# Patient Record
Sex: FEMALE | Race: WHITE | Employment: OTHER | URBAN - METROPOLITAN AREA
[De-identification: names, ages, dates, MRNs, and addresses within clinical notes are randomized per-mention and may not be internally consistent; named-entity substitution may affect disease eponyms.]

---

## 2017-01-09 ENCOUNTER — TELEPHONE (OUTPATIENT)
Dept: NEUROLOGY | Facility: CLINIC | Age: 42
End: 2017-01-09

## 2017-01-10 RX ORDER — DIVALPROEX SODIUM 500 MG/1
500 TABLET, DELAYED RELEASE ORAL 2 TIMES DAILY
Qty: 60 TABLET | Refills: 0 | Status: SHIPPED | OUTPATIENT
Start: 2017-01-10 | End: 2017-02-10

## 2017-02-10 RX ORDER — DIVALPROEX SODIUM 500 MG/1
500 TABLET, DELAYED RELEASE ORAL 2 TIMES DAILY
Qty: 60 TABLET | Refills: 0 | Status: SHIPPED | OUTPATIENT
Start: 2017-02-10 | End: 2017-04-18

## 2017-02-10 NOTE — TELEPHONE ENCOUNTER
Refill request for depakote 500 mg, BID, #60, no refills    LOV: 09/16/15  NOV: none      Called patient,no answer,left detailed vm asking patient to call back our office to schedule for a follow-up appointment.

## 2017-02-22 ENCOUNTER — HOSPITAL ENCOUNTER (OUTPATIENT)
Dept: MRI IMAGING | Age: 42
Discharge: HOME | End: 2017-02-22
Attending: INTERNAL MEDICINE
Payer: COMMERCIAL

## 2017-02-22 DIAGNOSIS — M51.26 LUMBAR HERNIATED DISC: ICD-10-CM

## 2017-02-22 DIAGNOSIS — M54.50 LOW BACK PAIN: ICD-10-CM

## 2017-02-22 PROCEDURE — 72148 MRI LUMBAR SPINE W/O DYE: CPT

## 2017-03-19 ENCOUNTER — LAB ENCOUNTER (OUTPATIENT)
Dept: LAB | Facility: HOSPITAL | Age: 42
End: 2017-03-19
Attending: INTERNAL MEDICINE
Payer: COMMERCIAL

## 2017-03-19 DIAGNOSIS — R79.89 LOW VITAMIN D LEVEL: ICD-10-CM

## 2017-03-19 DIAGNOSIS — R19.09 RIGHT GROIN MASS: ICD-10-CM

## 2017-03-19 LAB
ALBUMIN SERPL BCP-MCNC: 3.8 G/DL (ref 3.5–4.8)
ALBUMIN SERPL BCP-MCNC: 3.8 G/DL (ref 3.5–4.8)
ALP SERPL-CCNC: 28 U/L (ref 32–100)
ALT SERPL-CCNC: 15 U/L (ref 14–54)
ANION GAP SERPL CALC-SCNC: 8 MMOL/L (ref 0–18)
AST SERPL-CCNC: 19 U/L (ref 15–41)
BASOPHILS # BLD: 0 K/UL (ref 0–0.2)
BASOPHILS NFR BLD: 0 %
BILIRUB DIRECT SERPL-MCNC: 0.1 MG/DL (ref 0–0.2)
BILIRUB SERPL-MCNC: 0.7 MG/DL (ref 0.3–1.2)
BUN SERPL-MCNC: 18 MG/DL (ref 8–20)
BUN/CREAT SERPL: 19.6 (ref 10–20)
CALCIUM SERPL-MCNC: 9.2 MG/DL (ref 8.5–10.5)
CHLORIDE SERPL-SCNC: 106 MMOL/L (ref 95–110)
CO2 SERPL-SCNC: 24 MMOL/L (ref 22–32)
CREAT SERPL-MCNC: 0.92 MG/DL (ref 0.5–1.5)
EOSINOPHIL # BLD: 0.1 K/UL (ref 0–0.7)
EOSINOPHIL NFR BLD: 2 %
ERYTHROCYTE [DISTWIDTH] IN BLOOD BY AUTOMATED COUNT: 13 % (ref 11–15)
ERYTHROCYTE [SEDIMENTATION RATE] IN BLOOD: 7 MM/HR (ref 0–20)
GLUCOSE SERPL-MCNC: 90 MG/DL (ref 70–99)
HCT VFR BLD AUTO: 38.5 % (ref 35–48)
HGB BLD-MCNC: 12.8 G/DL (ref 12–16)
LYMPHOCYTES # BLD: 1.2 K/UL (ref 1–4)
LYMPHOCYTES NFR BLD: 21 %
MCH RBC QN AUTO: 28.9 PG (ref 27–32)
MCHC RBC AUTO-ENTMCNC: 33.2 G/DL (ref 32–37)
MCV RBC AUTO: 87 FL (ref 80–100)
MONOCYTES # BLD: 0.5 K/UL (ref 0–1)
MONOCYTES NFR BLD: 9 %
NEUTROPHILS # BLD AUTO: 3.7 K/UL (ref 1.8–7.7)
NEUTROPHILS NFR BLD: 68 %
OSMOLALITY UR CALC.SUM OF ELEC: 287 MOSM/KG (ref 275–295)
PHOSPHATE SERPL-MCNC: 3.6 MG/DL (ref 2.4–4.7)
PLATELET # BLD AUTO: 205 K/UL (ref 140–400)
PMV BLD AUTO: 9.2 FL (ref 7.4–10.3)
POTASSIUM SERPL-SCNC: 4.2 MMOL/L (ref 3.3–5.1)
PROT SERPL-MCNC: 6.7 G/DL (ref 5.9–8.4)
RBC # BLD AUTO: 4.42 M/UL (ref 3.7–5.4)
SODIUM SERPL-SCNC: 138 MMOL/L (ref 136–144)
WBC # BLD AUTO: 5.5 K/UL (ref 4–11)

## 2017-03-19 PROCEDURE — 80076 HEPATIC FUNCTION PANEL: CPT

## 2017-03-19 PROCEDURE — 80069 RENAL FUNCTION PANEL: CPT

## 2017-03-19 PROCEDURE — 36415 COLL VENOUS BLD VENIPUNCTURE: CPT

## 2017-03-19 PROCEDURE — 85025 COMPLETE CBC W/AUTO DIFF WBC: CPT

## 2017-03-19 PROCEDURE — 85652 RBC SED RATE AUTOMATED: CPT

## 2017-03-19 PROCEDURE — 82306 VITAMIN D 25 HYDROXY: CPT

## 2017-03-20 LAB — 25(OH)D3 SERPL-MCNC: 21.2 NG/ML

## 2017-04-18 NOTE — TELEPHONE ENCOUNTER
Dr. Nice Meals patient   Refill request for depakote 500 mg, BID, #60, no refills    LOV: 9/16/15  NOV: None

## 2017-04-19 RX ORDER — DIVALPROEX SODIUM 500 MG/1
500 TABLET, DELAYED RELEASE ORAL 2 TIMES DAILY
Qty: 60 TABLET | Refills: 0 | Status: SHIPPED | OUTPATIENT
Start: 2017-04-19 | End: 2017-06-09

## 2017-04-29 ENCOUNTER — HOSPITAL ENCOUNTER (EMERGENCY)
Facility: HOSPITAL | Age: 42
Discharge: HOME | End: 2017-04-29
Attending: EMERGENCY MEDICINE
Payer: COMMERCIAL

## 2017-04-29 VITALS
BODY MASS INDEX: 22.5 KG/M2 | HEART RATE: 77 BPM | WEIGHT: 140 LBS | OXYGEN SATURATION: 100 % | HEIGHT: 66 IN | SYSTOLIC BLOOD PRESSURE: 150 MMHG | DIASTOLIC BLOOD PRESSURE: 70 MMHG | TEMPERATURE: 98 F | RESPIRATION RATE: 14 BRPM

## 2017-04-29 DIAGNOSIS — K14.0 TONGUE INFECTION: Primary | ICD-10-CM

## 2017-04-29 DIAGNOSIS — K11.20 SIALOADENITIS: ICD-10-CM

## 2017-04-29 PROCEDURE — 99283 EMERGENCY DEPT VISIT LOW MDM: CPT

## 2017-04-29 RX ORDER — AMOXICILLIN AND CLAVULANATE POTASSIUM 875; 125 MG/1; MG/1
1 TABLET, FILM COATED ORAL 2 TIMES DAILY
Qty: 20 TABLET | Refills: 0 | Status: SHIPPED | OUTPATIENT
Start: 2017-04-29 | End: 2017-05-09

## 2017-04-29 RX ORDER — IBUPROFEN 600 MG/1
600 TABLET ORAL ONCE
Status: COMPLETED | OUTPATIENT
Start: 2017-04-29 | End: 2017-04-29

## 2017-04-30 NOTE — ED INITIAL ASSESSMENT (HPI)
Patient states tongue swelling getting progressively worse since yesterday; denies difficulty breathing or swallowing. Patient controlling secretions and speaking in full sentences.

## 2017-04-30 NOTE — ED PROVIDER NOTES
Patient Seen in: [facility] AND [facility] Emergency Department    History   Patient presents with:  Swelling Edema (cardiovascular, metabolic)    Stated Complaint: swollne tongue    HPI    Healthy 80-year-old female who began having some mild right lateral tong Temp src 04/29/17 2319 Oral   SpO2 04/29/17 2319 100 %   O2 Device 04/29/17 2319 None (Room air)       Current:/70 mmHg  Pulse 77  Temp(Src) 98.1 °F (36.7 °C) (Oral)  Resp 14  Ht 167.6 cm (5' 6\")  Wt 63.504 kg  BMI 22.61 kg/m2  SpO2 100%        Ph diagnosis)  Sialoadenitis    Disposition:  Discharge    Follow-up:  Laisha Josue 52  [phone]    Schedule an appointment as soon as possible for a visit in 2 days        Medications Prescribed:  C

## 2017-05-01 PROCEDURE — 87252 VIRUS INOCULATION TISSUE: CPT | Performed by: INTERNAL MEDICINE

## 2017-05-03 ENCOUNTER — TELEPHONE (OUTPATIENT)
Dept: NEUROLOGY | Facility: CLINIC | Age: 42
End: 2017-05-03

## 2017-05-03 NOTE — TELEPHONE ENCOUNTER
Teddy Tamayo said she forgot about the appointment. I asked if she received a reminder call, and she said she can't remember.  However, when [employer] was checked, Teddy Tamayo actually confirmed her appt on 5-1-17 at 5:44 pm  She will call back to reschedule

## 2017-05-08 PROBLEM — K62.5 BRIGHT RED RECTAL BLEEDING: Status: ACTIVE | Noted: 2017-05-08

## 2017-05-08 PROBLEM — R19.09 RIGHT GROIN MASS: Status: ACTIVE | Noted: 2017-05-08

## 2017-06-09 RX ORDER — DIVALPROEX SODIUM 500 MG/1
500 TABLET, DELAYED RELEASE ORAL 2 TIMES DAILY
Qty: 60 TABLET | Refills: 0 | Status: SHIPPED | OUTPATIENT
Start: 2017-06-09 | End: 2017-07-14

## 2017-06-09 NOTE — TELEPHONE ENCOUNTER
LOV 9/16/15. Multiple messages for patient to make NOV with past refills. No show 5/3/17. Request refill for depakote, please advise if will refill. I spoke to patient and transferred to  to make [address].  States she just wants medication refilled,

## 2017-07-12 RX ORDER — DIVALPROEX SODIUM 500 MG/1
TABLET, DELAYED RELEASE ORAL
Qty: 60 TABLET | Refills: 0 | OUTPATIENT
Start: 2017-07-12

## 2017-07-14 ENCOUNTER — OFFICE VISIT (OUTPATIENT)
Dept: NEUROLOGY | Facility: CLINIC | Age: 42
End: 2017-07-14

## 2017-07-14 VITALS
SYSTOLIC BLOOD PRESSURE: 102 MMHG | BODY MASS INDEX: 22.5 KG/M2 | HEART RATE: 56 BPM | DIASTOLIC BLOOD PRESSURE: 64 MMHG | RESPIRATION RATE: 16 BRPM | WEIGHT: 140 LBS | OXYGEN SATURATION: 97 % | HEIGHT: 66 IN

## 2017-07-14 DIAGNOSIS — M54.16 LUMBAR RADICULOPATHY: ICD-10-CM

## 2017-07-14 DIAGNOSIS — G40.909 SEIZURE DISORDER (HCC): Primary | ICD-10-CM

## 2017-07-14 PROCEDURE — 99213 OFFICE O/P EST LOW 20 MIN: CPT | Performed by: OTHER

## 2017-07-14 RX ORDER — DIVALPROEX SODIUM 500 MG/1
500 TABLET, DELAYED RELEASE ORAL 2 TIMES DAILY
Qty: 180 TABLET | Refills: 3 | Status: SHIPPED | OUTPATIENT
Start: 2017-07-14 | End: 2017-10-12

## 2017-07-14 NOTE — PROGRESS NOTES
Nupur Rawls : 1975     HPI:   Patient presents with:  Medication Follow-Up: LOV 09/16/15. Pt has had no seizure activity. Depokote 500mg po bid. Pt reports being totally asymptomtic.       Nupur Rawls was seen for neurologic evaluation July POSSIBLE POLYPECTOMY 68612;  Surgeon: Leena Sanches MD;  Location: [address] [place] [address]  , :       Comment: x 2   Family History   Problem Relation Age of Onset   • Cancer Brother      lymphoma   • Hypertension Father    • Janet Rowell attention span and concentration. Speech and language normal.  Oriented times three. Cranial Nerves: II-Visual acuity grossly normal, with full visual fields. Pupil react to light. Fundoscopic exam normal.  III,IV,VI- EOM full, with normal pursuit.  V

## 2017-08-07 RX ORDER — DIVALPROEX SODIUM 500 MG/1
TABLET, DELAYED RELEASE ORAL
Qty: 60 TABLET | Refills: 0 | OUTPATIENT
Start: 2017-08-07

## 2017-09-01 ENCOUNTER — OFFICE VISIT (OUTPATIENT)
Dept: INTERNAL MEDICINE CLINIC | Facility: CLINIC | Age: 42
End: 2017-09-01

## 2017-09-01 VITALS
WEIGHT: 144 LBS | DIASTOLIC BLOOD PRESSURE: 64 MMHG | HEART RATE: 88 BPM | HEIGHT: 66 IN | OXYGEN SATURATION: 98 % | BODY MASS INDEX: 23.14 KG/M2 | SYSTOLIC BLOOD PRESSURE: 96 MMHG

## 2017-09-01 DIAGNOSIS — Z00.00 PHYSICAL EXAM, ANNUAL: Primary | ICD-10-CM

## 2017-09-01 DIAGNOSIS — K64.9 HEMORRHOIDS, UNSPECIFIED HEMORRHOID TYPE: ICD-10-CM

## 2017-09-01 DIAGNOSIS — G40.909 SEIZURE DISORDER (HCC): ICD-10-CM

## 2017-09-01 DIAGNOSIS — M54.16 LUMBAR RADICULOPATHY: ICD-10-CM

## 2017-09-01 PROCEDURE — 99386 PREV VISIT NEW AGE 40-64: CPT | Performed by: INTERNAL MEDICINE

## 2017-09-01 NOTE — PROGRESS NOTES
Oval Phlegm is a 38year old female.     HPI:   Patient presents with:  Establish Care      \"Go-scia\" is 37 y/o F here to establish care and for physical exam; previous MD Dr Sal Chow; had recent UTI hat wa treated with Bactrim; no dysuria; no F/C;  n hearing loss and nasal drainage  Eyes:  Negative for eye discharge and vision loss  Cardiovascular:  Negative for chest pain; negative palpitations  Respiratory:  Negative for cough, dyspnea and wheezing  Endocrine:  Negative for abnormal sleep patterns, i Timothy    Lumbar radiculopathy  MRI lumbar spine in Feb 2017 shows foraminal stenosis at L5-S1 on the right; had RLE sciatica at that time; was given referral for physiatrist, though she was unable to pursue;   Pt requests order for physical therapy to [facility]

## 2017-10-31 ENCOUNTER — OFFICE VISIT (OUTPATIENT)
Dept: OBGYN CLINIC | Facility: CLINIC | Age: 42
End: 2017-10-31

## 2017-10-31 VITALS
HEART RATE: 69 BPM | BODY MASS INDEX: 24 KG/M2 | DIASTOLIC BLOOD PRESSURE: 71 MMHG | SYSTOLIC BLOOD PRESSURE: 107 MMHG | WEIGHT: 148.63 LBS

## 2017-10-31 DIAGNOSIS — Z12.31 VISIT FOR SCREENING MAMMOGRAM: ICD-10-CM

## 2017-10-31 DIAGNOSIS — Z12.4 SCREENING FOR MALIGNANT NEOPLASM OF CERVIX: ICD-10-CM

## 2017-10-31 DIAGNOSIS — Z01.419 ENCOUNTER FOR GYNECOLOGICAL EXAMINATION WITHOUT ABNORMAL FINDING: Primary | ICD-10-CM

## 2017-10-31 PROCEDURE — 99396 PREV VISIT EST AGE 40-64: CPT | Performed by: OBSTETRICS & GYNECOLOGY

## 2017-10-31 RX ORDER — DIVALPROEX SODIUM 500 MG/1
1000 TABLET, DELAYED RELEASE ORAL
COMMUNITY
End: 2019-07-29

## 2017-10-31 RX ORDER — IBUPROFEN 600 MG/1
600 TABLET ORAL
COMMUNITY
End: 2018-02-23 | Stop reason: ALTCHOICE

## 2017-10-31 NOTE — PROGRESS NOTES
Colton Beebe is a 38year old female [id number] Patient's last menstrual period was 09/29/2017.   Patient presents with:  Gyn Exam: Annual and mammo order, pt wants IUD removed  Pt  was plannig to have vasectomy but during preparation for surgery he not use an assistive device. .      The patient does live in a home with stairs.            FAMILY HISTORY:  Family History   Problem Relation Age of Onset   • Cancer Brother      lymphoma   • Hypertension Father    • Stroke Father      CVA       MEDICATIONS Oriented to time, place, person and situation. Appropriate mood and affect    Pelvic Exam:  External Genitalia: normal appearance, hair distribution, and no lesions, ?  Old healed follicultis in right groin, vs chain of lymph nodes- will f/u with pcp  Shawn Jimenez

## 2017-11-17 NOTE — PROGRESS NOTES
Neg pap, HPV neg, repeat pap needed in 3 years, return to clinic 1 year for annual exam. Letter Sent

## 2018-05-08 ENCOUNTER — TELEPHONE (OUTPATIENT)
Dept: PEDIATRICS CLINIC | Facility: CLINIC | Age: 43
End: 2018-05-08

## 2018-05-08 NOTE — TELEPHONE ENCOUNTER
Pt is on period, lmp 5/8, and wanting to have her IUD removed. Appointment made for when she is not on her period to have her IUD removed with [facility]. Pt states that she had bloating and swelling in her stomach during ovulation recently. Pt wants to know if this could be from the IUD, because she never had problems with these symptoms before, they are recently new.

## 2018-05-17 PROCEDURE — 82746 ASSAY OF FOLIC ACID SERUM: CPT | Performed by: INTERNAL MEDICINE

## 2018-05-17 PROCEDURE — 82607 VITAMIN B-12: CPT | Performed by: INTERNAL MEDICINE

## 2018-05-29 ENCOUNTER — OFFICE VISIT (OUTPATIENT)
Dept: OBGYN CLINIC | Facility: CLINIC | Age: 43
End: 2018-05-29

## 2018-05-29 VITALS
BODY MASS INDEX: 24 KG/M2 | DIASTOLIC BLOOD PRESSURE: 75 MMHG | SYSTOLIC BLOOD PRESSURE: 119 MMHG | WEIGHT: 148 LBS | HEART RATE: 60 BPM

## 2018-05-29 DIAGNOSIS — Z30.432 ENCOUNTER FOR REMOVAL OF INTRAUTERINE CONTRACEPTIVE DEVICE: Primary | ICD-10-CM

## 2018-05-29 PROCEDURE — 58301 REMOVE INTRAUTERINE DEVICE: CPT | Performed by: OBSTETRICS & GYNECOLOGY

## 2018-11-12 ENCOUNTER — OFFICE VISIT (OUTPATIENT)
Dept: OBGYN CLINIC | Facility: CLINIC | Age: 43
End: 2018-11-12

## 2018-11-12 VITALS
BODY MASS INDEX: 24.99 KG/M2 | DIASTOLIC BLOOD PRESSURE: 81 MMHG | WEIGHT: 150 LBS | HEIGHT: 65 IN | SYSTOLIC BLOOD PRESSURE: 121 MMHG | HEART RATE: 61 BPM

## 2018-11-12 DIAGNOSIS — Z01.419 ENCOUNTER FOR GYNECOLOGICAL EXAMINATION WITHOUT ABNORMAL FINDING: Primary | ICD-10-CM

## 2018-11-12 DIAGNOSIS — Z12.31 VISIT FOR SCREENING MAMMOGRAM: ICD-10-CM

## 2018-11-12 DIAGNOSIS — N94.3 PMS (PREMENSTRUAL SYNDROME): ICD-10-CM

## 2018-11-12 PROCEDURE — 99396 PREV VISIT EST AGE 40-64: CPT | Performed by: OBSTETRICS & GYNECOLOGY

## 2018-11-12 PROCEDURE — 99212 OFFICE O/P EST SF 10 MIN: CPT | Performed by: OBSTETRICS & GYNECOLOGY

## 2018-11-12 RX ORDER — NORETHINDRONE ACETATE AND ETHINYL ESTRADIOL 1MG-20(21)
1 KIT ORAL DAILY
Qty: 3 PACKAGE | Refills: 3 | Status: SHIPPED | OUTPATIENT
Start: 2018-11-12 | End: 2019-07-29 | Stop reason: ALTCHOICE

## 2018-11-12 NOTE — PROGRESS NOTES
Milton Justin is a 41year old female [id number] Patient's last menstrual period was 10/22/2018. Patient presents with:  Gyn Exam: ANNUAL EXAM  Pt has noted worsening mood swings after ovulation until her menstrual flow begins.   We discussed treatment opt Occupational History      Not on file    Tobacco Use      Smoking status: Never Smoker      Smokeless tobacco: Never Used    Substance and Sexual Activity      Alcohol use:  Yes        Alcohol/week: 0.0 oz        Comment: 1 glass wine socially      Drug us itching  Musculoskeletal:  denies back pain. Skin/Breast:  Denies any breast pain, lumps, or discharge. Neurological:  denies headaches, extremity weakness or numbness. Psychiatric: denies depression or anxiety.   Endocrine:   denies excessive thirst or (JUNEL FE 1/20) 1-20 MG-MCG Oral Tab 3 Package 3     Sig: Take 1 tablet by mouth daily.        [facility] IBRAHIMA 2D+3D SCREENING BILAT (CPT=77067/24388)

## 2019-07-30 PROCEDURE — 80164 ASSAY DIPROPYLACETIC ACD TOT: CPT | Performed by: INTERNAL MEDICINE

## 2019-12-06 ENCOUNTER — TELEPHONE (OUTPATIENT)
Dept: OBGYN CLINIC | Facility: CLINIC | Age: 44
End: 2019-12-06

## 2019-12-06 DIAGNOSIS — Z12.31 VISIT FOR SCREENING MAMMOGRAM: Primary | ICD-10-CM

## 2019-12-06 NOTE — TELEPHONE ENCOUNTER
LAST ANNUAL 11-12-18, LAST PAP 10-31-17 AND LAST MAMMO 9-15-15. ASSISTED IN [facility] NEXT ANNUAL ON 2-11-20. PT IS ASKING FOR A MAMMO ORDER.   ADVISED SHE NEEDS TO WAIT FOR EXAM BUT SHE ASKED US TO SEND THE MESSAGE ANYWAY BECAUSE SHE WOULD LIKE TO GET I

## 2019-12-06 NOTE — TELEPHONE ENCOUNTER
Pt states that she forgot to schedule her mammo and it seems to has . Pt would like to know if Dr. Aristides Rios can put another order in the system.  Please advise        [facility] IBRAHIMA 2D+3D SCREENING BILAT (CPT=77067/99758)

## 2019-12-09 NOTE — TELEPHONE ENCOUNTER
Informed pt [facility] approved mammo order. Mammo ordered. Provided pt with phone number to schedule. Pt verbalized understanding.

## 2020-02-04 ENCOUNTER — HOSPITAL ENCOUNTER (OUTPATIENT)
Dept: MAMMOGRAPHY | Facility: HOSPITAL | Age: 45
Discharge: HOME | End: 2020-02-04
Attending: OBSTETRICS & GYNECOLOGY
Payer: COMMERCIAL

## 2020-02-04 DIAGNOSIS — Z12.31 VISIT FOR SCREENING MAMMOGRAM: ICD-10-CM

## 2020-02-04 PROCEDURE — 77067 SCR MAMMO BI INCL CAD: CPT | Performed by: OBSTETRICS & GYNECOLOGY

## 2020-02-04 PROCEDURE — 77063 BREAST TOMOSYNTHESIS BI: CPT | Performed by: OBSTETRICS & GYNECOLOGY

## 2020-02-11 ENCOUNTER — OFFICE VISIT (OUTPATIENT)
Dept: OBGYN CLINIC | Facility: CLINIC | Age: 45
End: 2020-02-11
Payer: COMMERCIAL

## 2020-02-11 VITALS
HEIGHT: 66 IN | HEART RATE: 74 BPM | WEIGHT: 149 LBS | SYSTOLIC BLOOD PRESSURE: 125 MMHG | BODY MASS INDEX: 23.95 KG/M2 | DIASTOLIC BLOOD PRESSURE: 83 MMHG

## 2020-02-11 DIAGNOSIS — Z01.419 ENCOUNTER FOR GYNECOLOGICAL EXAMINATION WITHOUT ABNORMAL FINDING: Primary | ICD-10-CM

## 2020-02-11 DIAGNOSIS — N39.3 SUI (STRESS URINARY INCONTINENCE, FEMALE): ICD-10-CM

## 2020-02-11 PROCEDURE — 99396 PREV VISIT EST AGE 40-64: CPT | Performed by: OBSTETRICS & GYNECOLOGY

## 2020-02-11 PROCEDURE — 99212 OFFICE O/P EST SF 10 MIN: CPT | Performed by: OBSTETRICS & GYNECOLOGY

## 2020-02-11 NOTE — PROGRESS NOTES
Milton Justin is a 39year old female [id number] Patient's last menstrual period was 01/20/2020 (approximate). Patient presents with:  Gyn Exam: annual  .     Her cycles are  regular. She has c/o leaking when jumping at he gym.   We discussed MAITE and kege Days per week: Not on file        Minutes per session: Not on file      Stress: Not on file    Relationships      Social connections:        Talks on phone: Not on file        Gets together: Not on file        Attends Mandae[employer] service: Not on file constipation  Genitourinary:  denies dysuria, incontinence, abnormal vaginal discharge, vaginal itching  Musculoskeletal:  denies back pain. Skin/Breast:  Denies any breast pain, lumps, or discharge.    Neurological:  denies headaches, extremity weakness o requested or ordered in this encounter       None

## 2020-02-28 ENCOUNTER — TELEPHONE (OUTPATIENT)
Dept: OBGYN CLINIC | Facility: CLINIC | Age: 45
End: 2020-02-28

## 2020-02-28 DIAGNOSIS — Z32.00 PREGNANCY EXAMINATION OR TEST, PREGNANCY UNCONFIRMED: Primary | ICD-10-CM

## 2020-02-28 NOTE — TELEPHONE ENCOUNTER
LMP 2-28-20 and she had one on 2-12-20. Pt states that it is unusual for her to get two periods in one month. Pt state that she usually gets her period every 30 days. Pt wanting an IUD the Paragard. Pt had her Annual with [facility] 2-11-20 and IUD was not discussed. Pt would like to get a Paragard with this period. She states that she does not want to come in and discuss, because she states that she has had three IUDs in the pass. Sent to [facility] for recs.

## 2020-03-02 NOTE — TELEPHONE ENCOUNTER
[id number] Nadine Argueta for her to schedule the IUD placement- paragard without being seen first because I know her and she has had IUD's in the past.  We will order a serum qual bhcg the day before due to her last menses was irregular

## 2020-03-03 NOTE — TELEPHONE ENCOUNTER
Per [facility]-pt needs to complete STAT hcg qual this morning or appt for IUD insertion will need to be rescheduled. Called pt and [address] Laisha Pineda entered as STAT and pt was informed in VM that if unable to get lab done this morning will need to reschedule.

## 2021-04-01 ENCOUNTER — TELEPHONE (OUTPATIENT)
Dept: OBGYN CLINIC | Facility: CLINIC | Age: 46
End: 2021-04-01

## 2021-04-01 ENCOUNTER — OFFICE VISIT (OUTPATIENT)
Dept: OBGYN CLINIC | Facility: CLINIC | Age: 46
End: 2021-04-01
Payer: COMMERCIAL

## 2021-04-01 VITALS
WEIGHT: 152 LBS | SYSTOLIC BLOOD PRESSURE: 132 MMHG | BODY MASS INDEX: 25 KG/M2 | HEART RATE: 70 BPM | DIASTOLIC BLOOD PRESSURE: 85 MMHG

## 2021-04-01 DIAGNOSIS — Z12.31 ENCOUNTER FOR SCREENING MAMMOGRAM FOR MALIGNANT NEOPLASM OF BREAST: Primary | ICD-10-CM

## 2021-04-01 PROCEDURE — 3075F SYST BP GE 130 - 139MM HG: CPT | Performed by: CLINICAL NURSE SPECIALIST

## 2021-04-01 PROCEDURE — 99212 OFFICE O/P EST SF 10 MIN: CPT | Performed by: CLINICAL NURSE SPECIALIST

## 2021-04-01 PROCEDURE — 3079F DIAST BP 80-89 MM HG: CPT | Performed by: CLINICAL NURSE SPECIALIST

## 2021-04-01 NOTE — PROGRESS NOTES
Jann Urban is a 42year old female [id number] Patient's last menstrual period was 03/17/2021. Patient presents with:  Gyn Problem: BREAST PAIN ON LEFT NEAR NIPPLE  Here with complaints of left breast pain and sensitivity near the nipple since yesterday. stairs.     Social Determinants of Health  Financial Resource Strain:       Difficulty of Paying Living Expenses:   Food Insecurity:       Worried About Running Out of Food in the Last Year:       Ran Out of Food in the Last Year:   Transportation Needs: E.  Mammogram ordered  Follow up with [facility] for annual    Requested Prescriptions      No prescriptions requested or ordered in this encounter     Spent total time 15 minutes on obtaining history / chart review, evaluating patient / performing medically appr

## 2021-04-01 NOTE — TELEPHONE ENCOUNTER
Pt has been having left breast pain around nipple and makes the whole breast sore, would like to get in before traveling on Monday 4/5. Returning on 4/10    Please advise if able to come in before 4/5.

## 2021-10-28 ENCOUNTER — HOSPITAL ENCOUNTER (OUTPATIENT)
Dept: MAMMOGRAPHY | Facility: HOSPITAL | Age: 46
Discharge: HOME | End: 2021-10-28
Attending: CLINICAL NURSE SPECIALIST
Payer: COMMERCIAL

## 2021-10-28 DIAGNOSIS — Z12.31 ENCOUNTER FOR SCREENING MAMMOGRAM FOR MALIGNANT NEOPLASM OF BREAST: ICD-10-CM

## 2021-10-28 PROCEDURE — 77067 SCR MAMMO BI INCL CAD: CPT | Performed by: CLINICAL NURSE SPECIALIST

## 2021-10-28 PROCEDURE — 77063 BREAST TOMOSYNTHESIS BI: CPT | Performed by: CLINICAL NURSE SPECIALIST

## 2025-01-15 ENCOUNTER — OFFICE VISIT (OUTPATIENT)
Dept: OBGYN CLINIC | Facility: CLINIC | Age: 50
End: 2025-01-15
Payer: COMMERCIAL

## 2025-01-15 VITALS
BODY MASS INDEX: 25 KG/M2 | SYSTOLIC BLOOD PRESSURE: 133 MMHG | WEIGHT: 154 LBS | HEART RATE: 62 BPM | DIASTOLIC BLOOD PRESSURE: 82 MMHG

## 2025-01-15 DIAGNOSIS — Z01.419 ENCOUNTER FOR GYNECOLOGICAL EXAMINATION WITHOUT ABNORMAL FINDING: Primary | ICD-10-CM

## 2025-01-15 DIAGNOSIS — Z12.31 VISIT FOR SCREENING MAMMOGRAM: ICD-10-CM

## 2025-01-15 PROCEDURE — 99386 PREV VISIT NEW AGE 40-64: CPT | Performed by: OBSTETRICS & GYNECOLOGY

## 2025-01-15 PROCEDURE — 3079F DIAST BP 80-89 MM HG: CPT | Performed by: OBSTETRICS & GYNECOLOGY

## 2025-01-15 PROCEDURE — 88175 CYTOPATH C/V AUTO FLUID REDO: CPT | Performed by: OBSTETRICS & GYNECOLOGY

## 2025-01-15 PROCEDURE — 3075F SYST BP GE 130 - 139MM HG: CPT | Performed by: OBSTETRICS & GYNECOLOGY

## 2025-01-15 PROCEDURE — 87624 HPV HI-RISK TYP POOLED RSLT: CPT | Performed by: OBSTETRICS & GYNECOLOGY

## 2025-01-15 NOTE — PROGRESS NOTES
Dee Cooper is a 49 year old female  Patient's last menstrual period was 2024 (exact date).    Chief Complaint   Patient presents with    Gyn Problem     ANNUAL   .     Her cycles are regular.  She has no complaints.    OBSTETRICS HISTORY:  OB History    Para Term  AB Living   2 2 2 0 0 2   SAB IAB Ectopic Multiple Live Births   0 0 0 0 2       GYNE HISTORY:   Hx Prior Abnormal Pap: No  Pap Date: 10/31/17  Pap Result Notes: PAP & HPV NEG...MAMMO 2-4-20 BL BENIGN      MEDICAL HISTORY:  Past Medical History:    History of pregnancy     x 2    Seizure disorder (HCC)    drug therapy     Past Surgical History:   Procedure Laterality Date    Colonoscopy N/A 5/15/2017    Procedure: COLONOSCOPY, POSSIBLE BIOPSY, POSSIBLE POLYPECTOMY 74398;  Surgeon: Popeye Cotto MD;  Location: [facility]      , 2002    x 2         SOCIAL HISTORY:  Social History     Socioeconomic History    Marital status:    Tobacco Use    Smoking status: Never    Smokeless tobacco: Never   Vaping Use    Vaping status: Never Used   Substance and Sexual Activity    Alcohol use: Yes     Alcohol/week: 14.0 standard drinks of alcohol     Types: 14 Standard drinks or equivalent per week     Comment: 1-2 glasses wine nightly    Drug use: No    Sexual activity: Yes     Partners: Male   Other Topics Concern    Caffeine Concern No     Comment: 1-2 cups coffee daily    Exercise No        FAMILY HISTORY:  Family History   Problem Relation Age of Onset    Cancer Brother         lymphoma    Hypertension Father     Stroke Father         CVA       MEDICATIONS:    Current Outpatient Medications:     sulfamethoxazole-trimethoprim -160 MG Oral Tab per tablet, Take 1 tablet by mouth 2 (two) times daily. (Patient not taking: Reported on 1/15/2025), Disp: 6 tablet, Rfl: 0    ALLERGIES:  Allergies[1]    Blood pressure 133/82, pulse 62, weight 154 lb (69.9 kg), last menstrual period 2024, not currently  breastfeeding.    Review of Systems:  Constitutional:  Denies fatigue, night sweats, hot flashes  Eyes:  denies blurred or double vision  Cardiovascular:  denies chest pain or palpitations  Respiratory:  denies shortness of breath  Gastrointestinal:  denies heartburn, abdominal pain, diarrhea or constipation  Genitourinary:  denies dysuria, incontinence, abnormal vaginal discharge, vaginal itching  Musculoskeletal:  denies back pain.  Skin/Breast:  Denies any breast pain, lumps, or discharge.   Neurological:  denies headaches, extremity weakness or numbness.  Psychiatric: denies depression or anxiety.  Endocrine:   denies excessive thirst or urination.  Heme/Lymph:  denies history of anemia, easy bruising or bleeding.    Depression Screening (PHQ-2/PHQ-9): Over the LAST 2 WEEKS   Little interest or pleasure in doing things (over the last two weeks)?: Not at all    Feeling down, depressed, or hopeless (over the last two weeks)?: Not at all    PHQ-2 SCORE: 0           PHYSICAL EXAM:   Constitutional: well developed, well nourished  Head/Face: normocephalic  Neck/Thyroid: thyroid symmetric, no thyromegaly, no nodules, no adenopathy  Lymphatic:no abnormal supraclavicular or axillary adenopathy is noted  Breast: normal without palpable masses, tenderness, asymmetry, nipple discharge, nipple retraction or skin changes  Respiratory:  lungs clear to auscultation bilaterally  Cardiovascular: regular rate and rhythm, no significant murmur  Abdomen:  soft, nontender, nondistended, no masses  Skin/Hair: no unusual rashes or bruises  Extremities: no edema, no cyanosis  Psychiatric:  Oriented to time, place, person and situation. Appropriate mood and affect    Pelvic Exam:  External Genitalia: normal appearance, hair distribution, and no lesions  Urethral Meatus:  normal in size, location, without lesions and prolapse  Bladder:  No fullness, masses or tenderness  Vagina:  Normal appearance without lesions, no abnormal  discharge  Cervix:  Normal without tenderness on motion  Uterus: normal in size, contour, position, mobility, without tenderness  Adnexa: normal without masses or tenderness  Perineum: normal  Anus: no hemorroids     Assessment & Plan:    Encounter Diagnoses   Name Primary?    Encounter for gynecological examination without abnormal finding Yes    Visit for screening mammogram      ASCCP guidelines discussed,rtc 1 year for annual exam   SBE encouraged  No orders of the defined types were placed in this encounter.      Requested Prescriptions      No prescriptions requested or ordered in this encounter       [facility] IBRAHIMA 2D+3D SCREENING BILAT (CPT=77067/54658)                  [1] No Known Allergies

## 2025-01-16 LAB — HPV E6+E7 MRNA CVX QL NAA+PROBE: NEGATIVE

## 2025-01-23 ENCOUNTER — HOSPITAL ENCOUNTER (OUTPATIENT)
Dept: MAMMOGRAPHY | Age: 50
Discharge: HOME | End: 2025-01-23
Attending: OBSTETRICS & GYNECOLOGY
Payer: COMMERCIAL

## 2025-01-23 DIAGNOSIS — Z12.31 VISIT FOR SCREENING MAMMOGRAM: ICD-10-CM

## 2025-01-23 PROCEDURE — 77063 BREAST TOMOSYNTHESIS BI: CPT | Performed by: OBSTETRICS & GYNECOLOGY

## 2025-01-23 PROCEDURE — 77067 SCR MAMMO BI INCL CAD: CPT | Performed by: OBSTETRICS & GYNECOLOGY

## (undated) NOTE — LETTER
[address] [facility]  [address], [address]  [address] [place] [address] [phone]        Dear Juanita Carrillo MD,      I had the pleasure of seeing your patient, Izabella Velasquez on 7/14/2017.      Below please find a topically 2 (two) times daily. Disp: 30 g Rfl: 1     No current facility-administered medications for this visit.     Past Medical History:   Diagnosis Date   • History of mammogram 2014    per NG   • History of pregnancy ,      x 2   • [employer] GENITAL/: no dysuria, urgency or frequency; no nocturia  MUSCULOSKELETAL: Low back pain  PSYCHE:no depression or anxiety  NEURO: As in HPI    EXAM:     Vitals:  /64   Pulse 56   Resp 16   Ht 66\"   Wt 140 lb   SpO2 97%   BMI 22.60 kg/m²     General

## (undated) NOTE — ED AVS SNAPSHOT
[facility] Emergency Department    [address]     [place] [address]    Phone:  [phone]    Fax:  [phone]           Milton Justin   MRN: [id number]    Department:  [facility] Emergency Department   Date of Visit:  4/2 Amoxicillin-Pot Clavulanate 875-125 MG Tabs   Quantity:  20 tablet   Commonly known as:  AUGMENTIN   Take 1 tablet by mouth 2 (two) times daily.             Where to Get Your Medications      You can get these medications from any pharmacy     Bring a veronica [facility] Emergency Department. Follow-up care is at the discretion of that Physician.   If you need additional assistance selecting a physician, you may call the Percol[facility] Physician Referral and Class Registration line at [phone] [facility] [address] [address]  [place] [address]) [phone]                Additional Information       We are concerned for your overall well being:    - If you are a smoker or have smoked in the last 12 months, we encourage you to explore op

## (undated) NOTE — LETTER
2/7/2020              [address]        [place] [id number]         Dear Howie Terrazas,      It was a pleasure to see you at our [address] [facility] office.  Your Mammogram

## (undated) NOTE — ED AVS SNAPSHOT
[facility] Emergency Department    [address]     [place] [address]    Phone:  [phone]    Fax:  [phone]           Javi Higuera   MRN: [id number]    Department:  [facility] Emergency Department   Date of Visit:  4/2 and Class Registration line at [phone] or find a doctor online by visiting [email or web address].    IF THERE IS ANY CHANGE OR WORSENING OF YOUR CONDITION, CALL YOUR PRIMARY CARE PHYSICIAN AT ONCE OR RETURN IMMEDIATELY TO [address] [place] [address].     If

## (undated) NOTE — LETTER
11/17/2017              Jann Urban        [facility]        [address] [facility] [address]         Dear Quoc Duran,      It was a pleasure to see you at our [facility] [place], [place] office.  Neg pap, HPV neg, repeat pa

## (undated) NOTE — LETTER
AUTHORIZATION FOR SURGICAL OPERATION OR OTHER PROCEDURE    1. I hereby authorize Dr. Marly Richards, and [address] [place] [address] staff assigned to my case to perform the following operation and/or procedure at the [address] [place] [address]:    IUD REMOVAL      2.   My phy Responsible person                          []  Spouse  In case of minor or                    [] Other  _____________   Incompetent name:  __________________________________________________                               (please print)      _____________